# Patient Record
Sex: FEMALE
[De-identification: names, ages, dates, MRNs, and addresses within clinical notes are randomized per-mention and may not be internally consistent; named-entity substitution may affect disease eponyms.]

---

## 2018-02-08 ENCOUNTER — HOSPITAL ENCOUNTER (OUTPATIENT)
Dept: HOSPITAL 14 - H.OPSURG | Age: 38
Setting detail: OBSERVATION
LOS: 1 days | Discharge: HOME | End: 2018-02-09
Attending: OBSTETRICS & GYNECOLOGY | Admitting: OBSTETRICS & GYNECOLOGY
Payer: COMMERCIAL

## 2018-02-08 VITALS — BODY MASS INDEX: 22.4 KG/M2

## 2018-02-08 DIAGNOSIS — N80.8: ICD-10-CM

## 2018-02-08 DIAGNOSIS — N94.10: ICD-10-CM

## 2018-02-08 DIAGNOSIS — N80.2: ICD-10-CM

## 2018-02-08 DIAGNOSIS — Z88.2: ICD-10-CM

## 2018-02-08 DIAGNOSIS — N32.89: ICD-10-CM

## 2018-02-08 DIAGNOSIS — N80.3: ICD-10-CM

## 2018-02-08 DIAGNOSIS — N80.0: Primary | ICD-10-CM

## 2018-02-08 DIAGNOSIS — N94.6: ICD-10-CM

## 2018-02-08 DIAGNOSIS — N80.1: ICD-10-CM

## 2018-02-08 LAB
ERYTHROCYTE [DISTWIDTH] IN BLOOD BY AUTOMATED COUNT: 13.5 % (ref 11.5–14.5)
HGB BLD-MCNC: 14.1 G/DL (ref 12–16)
MCH RBC QN AUTO: 30.3 PG (ref 27–31)
MCHC RBC AUTO-ENTMCNC: 33.2 G/DL (ref 33–37)
MCV RBC AUTO: 91.3 FL (ref 81–99)
PLATELET # BLD: 333 K/UL (ref 130–400)
RBC # BLD AUTO: 4.65 MIL/UL (ref 3.8–5.2)
WBC # BLD AUTO: 8.1 K/UL (ref 4.8–10.8)

## 2018-02-08 PROCEDURE — 52235 CYSTOSCOPY AND TREATMENT: CPT

## 2018-02-08 PROCEDURE — 96374 THER/PROPH/DIAG INJ IV PUSH: CPT

## 2018-02-08 PROCEDURE — 86900 BLOOD TYPING SEROLOGIC ABO: CPT

## 2018-02-08 PROCEDURE — 96375 TX/PRO/DX INJ NEW DRUG ADDON: CPT

## 2018-02-08 PROCEDURE — 85027 COMPLETE CBC AUTOMATED: CPT

## 2018-02-08 PROCEDURE — 88305 TISSUE EXAM BY PATHOLOGIST: CPT

## 2018-02-08 PROCEDURE — 96376 TX/PRO/DX INJ SAME DRUG ADON: CPT

## 2018-02-08 PROCEDURE — 36415 COLL VENOUS BLD VENIPUNCTURE: CPT

## 2018-02-08 PROCEDURE — 58662 LAPAROSCOPY EXCISE LESIONS: CPT

## 2018-02-08 PROCEDURE — 86850 RBC ANTIBODY SCREEN: CPT

## 2018-02-08 RX ADMIN — BUPIVACAINE HYDROCHLORIDE ONE [, ML]: 5 INJECTION, SOLUTION EPIDURAL; INTRACAUDAL; PERINEURAL at 07:52

## 2018-02-08 RX ADMIN — BUPIVACAINE HYDROCHLORIDE ONE [, ML]: 5 INJECTION, SOLUTION EPIDURAL; INTRACAUDAL; PERINEURAL at 09:30

## 2018-02-08 RX ADMIN — OXYCODONE HYDROCHLORIDE AND ACETAMINOPHEN PRN [, TAB]: 5; 325 TABLET ORAL at 23:21

## 2018-02-09 VITALS
OXYGEN SATURATION: 98 % | TEMPERATURE: 98.7 F | SYSTOLIC BLOOD PRESSURE: 97 MMHG | RESPIRATION RATE: 19 BRPM | HEART RATE: 76 BPM | DIASTOLIC BLOOD PRESSURE: 58 MMHG

## 2018-02-09 RX ADMIN — OXYCODONE HYDROCHLORIDE AND ACETAMINOPHEN PRN [, TAB]: 5; 325 TABLET ORAL at 07:40

## 2018-03-06 NOTE — OP
PROCEDURE DATE:  02/08/2018



PREOPERATIVE DIAGNOSES:  Pelvic pain, dysmenorrhea, dyspareunia, rule out

pelvic endometriosis, also rule out Mullerian anomaly.



POSTOPERATIVE DIAGNOSES:  Pelvic pain, dysmenorrhea, dyspareunia, rule out

pelvic endometriosis, also rule out Mullerian anomaly plus no evidence of

bicornuate uterus, and pelvic endometriosis.



PROCEDURE PERFORMED:  Hysteroscopy diagnostic, da lee ann robotic laparoscopy with

excision of endometriosis, excision of anterior bladder mass, and right

ureterolysis.



SURGEON:  Charly Davis MD



ASSISTANT:  Manoj Dunn MD



TYPE OF ANESTHESIA:  General endotracheal.



COMPLICATIONS:  None.



ESTIMATED BLOOD LOSS:  Minimal.



SPECIMENS:  Multiple specimens including bladder endometriosis, left pelvic

side wall endometriosis, posterior cervical, left uterosacral, right

fallopian tube endometriosis, right ovarian fossa, right periureteral

endometriosis, right pelvic side wall endometriosis, and right ovarian

fossa endometriosis.



INDICATION FOR THE PROCEDURE:  The patient is a 37-year-old female with a

history of pelvic pain, dysmenorrhea, and dyspareunia.  The patient had

left renal agenesis, suggestive of potential of Mullerian anomaly.  Prior

to the surgery, she was counseled with all the risks and benefits with

regarding to the surgery and she reaffirmed the consent and signed it and

she was taken to the OR.



DESCRIPTION OF PROCEDURE:  After adequate anesthesia was obtained, the

patient was placed in dorsal lithotomy position.  She was prepped and

draped.  The surgeon was gowned and gloved.  Extreme attention was placed

on positioning the patient in an extensive padding in all areas prone to

pressure and also placing her hips in a fixed position, in a comfortable

position without extending or flexing her hips.  At this point, the

procedure was started.  A Beckford was placed into the bladder.  A speculum

was placed into the vagina.  The anterior lip of the cervix was grasped. 

The cervix was dilated and uterine cavity was identified.  The uterine

cavity appeared to be normal in size and both ureteral ostia were

visualized.  At this point, attention was on the abdomen where an open

laparoscopy technique was utilized while making incision on the skin,

incising the fascia and entering the peritoneum in a blunt fashion.  The

abdomen was insufflated.  The pelvis and the abdomen were visualized.  The

upper abdomen appeared to be clear of lesions and liver surface appeared to

be normal.  At this point, attention was in the pelvic area where the uterus 
was slightly missshapen due  to presence of what appeared to

be adenomyosis or small fibroids.  The right tube appeared to have some

level of endosalpingiosis or salpingitis isthmica nodosa.  The left tube

appeared to be normal.  The anterior bladder had like a large mass

anteriorly, suggestive of endometriosis in the smaller area on the right

hand side.  The pelvis had endometriosis implants in multiple areas. 

Attention was first anteriorly where the bladder was identified and an

incision was made around the mass and it was progressively dissected off

with extreme care not to enter the muscularis of the bladder.  An

additional lesion also on the right hand side on the serosa of the bladder

was excised.  At this point, attention was posteriorly where after

elevating the left ovary, an area of endometriosis was identified in the

left ovarian fossa.  This full area was then progressively excised with

extreme care not to damage the vessel.  At this point, attention was on the

posterior cervix where again an area of endometriosis were identified in

the posterior area and it was excised.  Attention was in the right hand

side where the ovary appeared to be attached to the posterior to the pelvis

side wall.  The ovary was elevated and multiple focuses of endometriosis

were identified on the pelvis.  Attention was first in the upper area where

right below in the infundibulopelvic ligament, a progressive dissection was

done, entering the retroperitoneal space.  A full dissection was performed

very carefully dissecting the ureter on the left hand side and freeing it

up.  The perineum was quite adherent, but a good dissection was performed,

excising a large area of endometriosis.  At this point, on the right

ovarian fossa, again focus of endometriosis was identified and it was also

excised and removed with great care to preserve the uterine vessels.  At

this point, multiple areas of what appeared to be endometriosis implants on

the right fallopian tube were also excised leaving the right fallopian tube

intact.  Both tubes had normal fimbriated ends.  At this point, it was

checked for hemostasis and appeared to be excellent.  The pelvis was

extensively irrigated.  The da Lee Ann robot was undocked.  The instruments

were removed.  The abdomen was desufflated.  The incision was closed with

PDS for the fascia and 4-0 Monocryl for the skin.  At the end of the

procedure, all tapes and instruments counts were correct.  The patient

tolerated the procedure well, was taken to the recovery room in excellent

condition.





__________________________________________

Charly Davis MD





DD:  03/06/2018 13:22:30

DT:  03/06/2018 14:43:34

[facility] # [id number]



[facility]